# Patient Record
Sex: MALE | Race: WHITE
[De-identification: names, ages, dates, MRNs, and addresses within clinical notes are randomized per-mention and may not be internally consistent; named-entity substitution may affect disease eponyms.]

---

## 2019-11-13 ENCOUNTER — HOSPITAL ENCOUNTER (EMERGENCY)
Dept: HOSPITAL 25 - ED | Age: 19
Discharge: HOME | End: 2019-11-13
Payer: COMMERCIAL

## 2019-11-13 VITALS — SYSTOLIC BLOOD PRESSURE: 152 MMHG | DIASTOLIC BLOOD PRESSURE: 94 MMHG

## 2019-11-13 DIAGNOSIS — Z87.891: ICD-10-CM
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
## 2019-11-13 DIAGNOSIS — S06.0X9D: Primary | ICD-10-CM

## 2019-11-13 PROCEDURE — 99281 EMR DPT VST MAYX REQ PHY/QHP: CPT
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
